# Patient Record
Sex: FEMALE | Race: WHITE | Employment: FULL TIME | URBAN - METROPOLITAN AREA
[De-identification: names, ages, dates, MRNs, and addresses within clinical notes are randomized per-mention and may not be internally consistent; named-entity substitution may affect disease eponyms.]

---

## 2017-04-27 LAB
CHLAMYDIA, EXTERNAL: NEGATIVE
HBSAG, EXTERNAL: NEGATIVE
N. GONORRHEA, EXTERNAL: NEGATIVE
RPR, EXTERNAL: NONREACTIVE
RUBELLA, EXTERNAL: NORMAL
TYPE, ABO & RH, EXTERNAL: NORMAL

## 2017-09-15 ENCOUNTER — HOSPITAL ENCOUNTER (OUTPATIENT)
Dept: INFUSION THERAPY | Age: 27
Discharge: HOME | End: 2017-09-15
Payer: COMMERCIAL

## 2017-09-15 VITALS
HEIGHT: 68 IN | WEIGHT: 208 LBS | HEART RATE: 81 BPM | TEMPERATURE: 98.5 F | BODY MASS INDEX: 31.52 KG/M2 | SYSTOLIC BLOOD PRESSURE: 109 MMHG | DIASTOLIC BLOOD PRESSURE: 70 MMHG | RESPIRATION RATE: 18 BRPM

## 2017-09-15 LAB
ABO + RH BLD: NORMAL
BLOOD GROUP ANTIBODIES SERPL: NORMAL
SPECIMEN EXP DATE BLD: NORMAL

## 2017-09-15 PROCEDURE — 96372 THER/PROPH/DIAG INJ SC/IM: CPT

## 2017-09-15 PROCEDURE — 36415 COLL VENOUS BLD VENIPUNCTURE: CPT | Performed by: OBSTETRICS & GYNECOLOGY

## 2017-09-15 PROCEDURE — 86900 BLOOD TYPING SEROLOGIC ABO: CPT | Performed by: OBSTETRICS & GYNECOLOGY

## 2017-09-15 PROCEDURE — 74011250636 HC RX REV CODE- 250/636: Performed by: OBSTETRICS & GYNECOLOGY

## 2017-09-15 RX ORDER — ACETAMINOPHEN 325 MG/1
650 TABLET ORAL
COMMUNITY

## 2017-09-15 RX ADMIN — HUMAN RHO(D) IMMUNE GLOBULIN 0.3 MG: 300 INJECTION, SOLUTION INTRAMUSCULAR at 14:37

## 2017-09-15 NOTE — DISCHARGE INSTRUCTIONS
Learning About Rh Immunoglobulin Shots  Introduction  An Rh immunoglobulin shot is given to pregnant women who have Rh-negative blood. You may have Rh-negative blood, and your baby may have Rh-positive blood. If the two types of blood mix, your body will make antibodies. This is called Rh sensitization. Most of the time, this is not a problem the first time you're pregnant. But it could cause problems in future pregnancies. This shot keeps your body from making the antibodies. You get the shot around 28 weeks of pregnancy. After the birth, your baby's blood is tested. If the blood is Rh positive, you will get another shot. You may also get the shot if you have vaginal bleeding while you are pregnant or if you have a miscarriage. These shots protect future pregnancies. Women with Rh negative blood will need this shot each time they get pregnant. Example  · Rh immunoglobulin (HypRho-D, MICRhoGAM, and RhoGAM)  Possible side effects  Rare side effects may include:  · Some mild pain where you got the shot. · A slight fever. · An allergic reaction. You may have other side effects not listed here. Check the information that comes with your medicine. What to know about taking this medicine  · You may need more than one shot. You may need the shot again:  ¨ After amniocentesis, fetal blood sampling, or chorionic villus sampling tests. ¨ If you have bleeding in your second or third trimester. ¨ After turning of a breech baby. ¨ After an injury to the belly while you are pregnant. ¨ After a miscarriage or an . ¨ Before or right after treatment for an ectopic or a partial molar pregnancy. · Tell your doctor if you have any allergies or have had a bad response to medicines in the past.  · If you get this shot within 3 months of getting a live-virus vaccine, the vaccine may not work. Your doctor will tell you if you need more vaccine.   · Check with your doctor or pharmacist before you use any other medicines. This includes over-the-counter medicines. Make sure your doctor knows all of the medicines, vitamins, herbs, and supplements you take. Taking some medicines at the same time can cause problems. Where can you learn more? Go to http://snehal-rosa isela.info/. Enter R266 in the search box to learn more about \"Learning About Rh Immunoglobulin Shots. \"  Current as of: March 16, 2017  Content Version: 11.3  © 5639-1403 [employer]. Care instructions adapted under license by [employer] (which disclaims liability or warranty for this information). If you have questions about a medical condition or this instruction, always ask your healthcare professional. [address] any warranty or liability for your use of this information.

## 2017-09-15 NOTE — PROGRESS NOTES
[facility] Progress Note    Date: September 15, 2017    Name: Maikol Milligan    MRN: [id number]         : 1990      Ms. Jefferson Vargas was assessed and education was provided. Consent witnessed for blood product administration. Provided with written information on Rhogam.    Ms. Konrad Brooks vitals were reviewed and patient was observed for 5 minutes prior to treatment. Visit Vitals    /70 (BP 1 Location: Left arm, BP Patient Position: Sitting)    Pulse 81    Temp 98.5 °F (36.9 °C)    Resp 18    Ht 5' 8\" (1.727 m)    Wt 94.3 kg (208 lb)    Breastfeeding No    BMI 31.63 kg/m2       Lab results were obtained and reviewed. Recent Results (from the past 12 hour(s))   TYPE & SCREEN    Collection Time: 09/15/17  1:04 PM   Result Value Ref Range    Crossmatch Expiration 2017     ABO/Rh(D) A NEGATIVE     Antibody screen NEG    RH IMMUNE GLOBULIN PROPHYLACTIC    Collection Time: 09/15/17  1:04 PM   Result Value Ref Range    No. of weeks gestation 28      Unit number [id number]     Blood component type RH IMMUNE GLOBULIN     Unit division 00     Status of unit ISSUED        Rhogam 300 mcg was administered IM in left deltoid. No irritation noted at site, band aid applied. Patient observed for 30 minutes, no s/s reaction noted. .       Ms. Jefferson Vargas tolerated well, and had no complaints. Patient armband removed and shredded. Ms. Jefferson Vargas was discharged from [place] [address] in stable condition at 1510. She has no appointment to return to [facility] but will follow up with Dr. Alvin Candelario as scheduled for her next appointment.     Aster Veras RN  September 15, 2017  3:11 PM

## 2017-09-16 LAB
BLD PROD TYP BPU: NORMAL
BPU ID: NORMAL
GA (WEEKS): 28 WK
STATUS OF UNIT,%ST: NORMAL
UNIT DIVISION, %UDIV: 0

## 2017-11-05 LAB — GRBS, EXTERNAL: NEGATIVE

## 2017-12-14 ENCOUNTER — HOSPITAL ENCOUNTER (INPATIENT)
Age: 27
LOS: 4 days | Discharge: HOME | End: 2017-12-18
Attending: OBSTETRICS & GYNECOLOGY | Admitting: OBSTETRICS & GYNECOLOGY
Payer: COMMERCIAL

## 2017-12-14 PROBLEM — O41.00X0 OLIGOHYDRAMNIOS: Status: ACTIVE | Noted: 2017-12-14

## 2017-12-14 LAB
ABO + RH BLD: NORMAL
BASOPHILS # BLD: 0 K/UL (ref 0–0.06)
BASOPHILS NFR BLD: 0 % (ref 0–2)
BLOOD BANK CMNT PATIENT-IMP: NORMAL
BLOOD GROUP ANTIBODIES SERPL: NORMAL
BLOOD GROUP ANTIBODIES SERPL: NORMAL
DIFFERENTIAL METHOD BLD: ABNORMAL
EOSINOPHIL # BLD: 0.1 K/UL (ref 0–0.4)
EOSINOPHIL NFR BLD: 1 % (ref 0–5)
ERYTHROCYTE [DISTWIDTH] IN BLOOD BY AUTOMATED COUNT: 13.7 % (ref 11.6–14.5)
HCT VFR BLD AUTO: 37.3 % (ref 35–45)
HGB BLD-MCNC: 12.5 G/DL (ref 12–16)
LYMPHOCYTES # BLD: 1.6 K/UL (ref 0.9–3.6)
LYMPHOCYTES NFR BLD: 20 % (ref 21–52)
MCH RBC QN AUTO: 28.3 PG (ref 24–34)
MCHC RBC AUTO-ENTMCNC: 33.5 G/DL (ref 31–37)
MCV RBC AUTO: 84.4 FL (ref 74–97)
MONOCYTES # BLD: 0.6 K/UL (ref 0.05–1.2)
MONOCYTES NFR BLD: 8 % (ref 3–10)
NEUTS SEG # BLD: 5.6 K/UL (ref 1.8–8)
NEUTS SEG NFR BLD: 71 % (ref 40–73)
PLATELET # BLD AUTO: 180 K/UL (ref 135–420)
PMV BLD AUTO: 9.9 FL (ref 9.2–11.8)
RBC # BLD AUTO: 4.42 M/UL (ref 4.2–5.3)
SPECIMEN EXP DATE BLD: NORMAL
WBC # BLD AUTO: 7.8 K/UL (ref 4.6–13.2)

## 2017-12-14 PROCEDURE — 86900 BLOOD TYPING SEROLOGIC ABO: CPT | Performed by: OBSTETRICS & GYNECOLOGY

## 2017-12-14 PROCEDURE — 36415 COLL VENOUS BLD VENIPUNCTURE: CPT | Performed by: OBSTETRICS & GYNECOLOGY

## 2017-12-14 PROCEDURE — 74011250637 HC RX REV CODE- 250/637: Performed by: ADVANCED PRACTICE MIDWIFE

## 2017-12-14 PROCEDURE — 75410000002 HC LABOR FEE PER 1 HR

## 2017-12-14 PROCEDURE — 85025 COMPLETE CBC W/AUTO DIFF WBC: CPT | Performed by: OBSTETRICS & GYNECOLOGY

## 2017-12-14 PROCEDURE — 74011250636 HC RX REV CODE- 250/636: Performed by: OBSTETRICS & GYNECOLOGY

## 2017-12-14 PROCEDURE — 86870 RBC ANTIBODY IDENTIFICATION: CPT | Performed by: OBSTETRICS & GYNECOLOGY

## 2017-12-14 PROCEDURE — 74011250637 HC RX REV CODE- 250/637: Performed by: OBSTETRICS & GYNECOLOGY

## 2017-12-14 PROCEDURE — 65270000029 HC RM PRIVATE

## 2017-12-14 RX ORDER — OXYTOCIN/RINGER'S LACTATE 20/1000 ML
125 PLASTIC BAG, INJECTION (ML) INTRAVENOUS CONTINUOUS
Status: DISCONTINUED | OUTPATIENT
Start: 2017-12-14 | End: 2017-12-16 | Stop reason: HOSPADM

## 2017-12-14 RX ORDER — OXYTOCIN IN 5 % DEXTROSE 30/500 ML
.5-2 PLASTIC BAG, INJECTION (ML) INTRAVENOUS
Status: CANCELLED | OUTPATIENT
Start: 2017-12-15

## 2017-12-14 RX ORDER — OXYTOCIN/RINGER'S LACTATE 20/1000 ML
500 PLASTIC BAG, INJECTION (ML) INTRAVENOUS ONCE
Status: ACTIVE | OUTPATIENT
Start: 2017-12-14 | End: 2017-12-15

## 2017-12-14 RX ORDER — CALCIUM CARBONATE 200(500)MG
200 TABLET,CHEWABLE ORAL
Status: DISCONTINUED | OUTPATIENT
Start: 2017-12-14 | End: 2017-12-18 | Stop reason: HOSPADM

## 2017-12-14 RX ORDER — LIDOCAINE HYDROCHLORIDE 10 MG/ML
20 INJECTION, SOLUTION EPIDURAL; INFILTRATION; INTRACAUDAL; PERINEURAL AS NEEDED
Status: DISCONTINUED | OUTPATIENT
Start: 2017-12-14 | End: 2017-12-16 | Stop reason: HOSPADM

## 2017-12-14 RX ORDER — NALBUPHINE HYDROCHLORIDE 10 MG/ML
10 INJECTION, SOLUTION INTRAMUSCULAR; INTRAVENOUS; SUBCUTANEOUS
Status: DISCONTINUED | OUTPATIENT
Start: 2017-12-14 | End: 2017-12-16 | Stop reason: HOSPADM

## 2017-12-14 RX ORDER — ZOLPIDEM TARTRATE 5 MG/1
5 TABLET ORAL
Status: DISCONTINUED | OUTPATIENT
Start: 2017-12-14 | End: 2017-12-18 | Stop reason: HOSPADM

## 2017-12-14 RX ORDER — METHYLERGONOVINE MALEATE 0.2 MG/ML
0.2 INJECTION INTRAVENOUS AS NEEDED
Status: DISCONTINUED | OUTPATIENT
Start: 2017-12-14 | End: 2017-12-16 | Stop reason: HOSPADM

## 2017-12-14 RX ORDER — MINERAL OIL
30 OIL (ML) ORAL AS NEEDED
Status: DISCONTINUED | OUTPATIENT
Start: 2017-12-14 | End: 2017-12-16 | Stop reason: HOSPADM

## 2017-12-14 RX ORDER — ONDANSETRON 2 MG/ML
4 INJECTION INTRAMUSCULAR; INTRAVENOUS
Status: DISCONTINUED | OUTPATIENT
Start: 2017-12-14 | End: 2017-12-16 | Stop reason: HOSPADM

## 2017-12-14 RX ORDER — SODIUM CHLORIDE, SODIUM LACTATE, POTASSIUM CHLORIDE, CALCIUM CHLORIDE 600; 310; 30; 20 MG/100ML; MG/100ML; MG/100ML; MG/100ML
125 INJECTION, SOLUTION INTRAVENOUS CONTINUOUS
Status: DISCONTINUED | OUTPATIENT
Start: 2017-12-14 | End: 2017-12-16 | Stop reason: HOSPADM

## 2017-12-14 RX ORDER — CALCIUM CARBONATE 200(500)MG
1 TABLET,CHEWABLE ORAL DAILY
COMMUNITY

## 2017-12-14 RX ORDER — TERBUTALINE SULFATE 1 MG/ML
0.25 INJECTION SUBCUTANEOUS
Status: DISCONTINUED | OUTPATIENT
Start: 2017-12-14 | End: 2017-12-16 | Stop reason: HOSPADM

## 2017-12-14 RX ORDER — BUTORPHANOL TARTRATE 2 MG/ML
2 INJECTION INTRAMUSCULAR; INTRAVENOUS
Status: DISCONTINUED | OUTPATIENT
Start: 2017-12-14 | End: 2017-12-16 | Stop reason: HOSPADM

## 2017-12-14 RX ORDER — HYDROMORPHONE HYDROCHLORIDE 2 MG/ML
1 INJECTION, SOLUTION INTRAMUSCULAR; INTRAVENOUS; SUBCUTANEOUS
Status: DISCONTINUED | OUTPATIENT
Start: 2017-12-14 | End: 2017-12-16 | Stop reason: HOSPADM

## 2017-12-14 RX ORDER — MISOPROSTOL 100 UG/1
25 TABLET ORAL EVERY 4 HOURS
Status: COMPLETED | OUTPATIENT
Start: 2017-12-14 | End: 2017-12-15

## 2017-12-14 RX ADMIN — ZOLPIDEM TARTRATE 5 MG: 5 TABLET ORAL at 22:07

## 2017-12-14 RX ADMIN — MISOPROSTOL 25 MCG: 100 TABLET ORAL at 16:10

## 2017-12-14 RX ADMIN — SODIUM CHLORIDE, SODIUM LACTATE, POTASSIUM CHLORIDE, AND CALCIUM CHLORIDE 125 ML/HR: 600; 310; 30; 20 INJECTION, SOLUTION INTRAVENOUS at 21:58

## 2017-12-14 RX ADMIN — MISOPROSTOL 25 MCG: 100 TABLET ORAL at 20:14

## 2017-12-14 RX ADMIN — ANTACID TABLETS 200 MG: 500 TABLET, CHEWABLE ORAL at 20:57

## 2017-12-14 RX ADMIN — SODIUM CHLORIDE, SODIUM LACTATE, POTASSIUM CHLORIDE, AND CALCIUM CHLORIDE 125 ML/HR: 600; 310; 30; 20 INJECTION, SOLUTION INTRAVENOUS at 15:24

## 2017-12-14 NOTE — IP AVS SNAPSHOT
Summary of Care Report The Summary of Care report has been created to help improve care coordination. Users with access to [employer] or [address] (Web-based application) may access additional patient information including the Discharge Summary. If you are not currently a [address] user and need more information, please call the number listed below in the [place] [address] section and ask to be connected with Medical Records. Facility Information Name Address Phone [address] [facility] [address] [phone] Patient Information Patient Name Sex  Dusty Quintero ([id number]) Female 1990 Discharge Information Admitting Provider Service Area Unit Jimi Winter MD / [address] / [phone] Discharge Provider Discharge Date/Time Discharge Disposition Destination Fletcher Zamorano MD / [phone] 2017 (Pending) AHR (none) Patient Language Language ENGLISH [13] Hospital Problems as of 2017  Reviewed: 2017 12:08 AM by Mayra Morrison MD  
  
  
  
 Class Noted - Resolved Last Modified POA Active Problems Chorioamnionitis  12/15/2017 - Present 12/15/2017 by Mayra Morrison MD No  
  Entered by Mayra Morrison MD  
  S/P  section  2017 - Present 2017 by Mayra Morrison MD No  
  Entered by Mayra Morrison MD  
  Arrest of dilation, delivered, current hospitalization  2017 - Present 2017 by Mayra Morrison MD No  
  Entered by Mayra Morrison MD  
  
Non-Hospital Problems as of 2017  Reviewed: 2017 12:08 AM by Mayra Morrison MD  
 None You are allergic to the following Allergen Reactions Kiwi Swelling Current Discharge Medication List  
  
START taking these medications Dose & Instructions Dispensing Information Comments  
 ibuprofen 800 mg tablet Commonly known as:  MOTRIN Start taking on:  2017 Dose:  800 mg Take 1 Tab by mouth every eight (8) hours as needed. Quantity:  60 Tab Refills:  1  
   
 oxyCODONE-acetaminophen 5-325 mg per tablet Commonly known as:  PERCOCET Dose:  1-2 Tab Take 1-2 Tabs by mouth every four (4) hours as needed. Max Daily Amount: 12 Tabs. Quantity:  30 Tab Refills:  0 ASK your doctor about these medications Dose & Instructions Dispensing Information Comments  
 calcium carbonate 200 mg calcium (500 mg) Chew Commonly known as:  TUMS Dose:  1 Tab Take 1 Tab by mouth daily. Refills:  0 PRENATAL DHA+COMPLETE PRENATAL -300 mg-mcg-mg Cmpk Generic drug:  [id number] jeremiah-folic-dha Take  by mouth. Refills:  0  
   
 TYLENOL 325 mg tablet Generic drug:  acetaminophen Dose:  650 mg Take 650 mg by mouth every four (4) hours as needed for Pain. Refills:  0 Current Immunizations Name Date Rho(D) Immune Globulin - IM 2017, 9/15/2017 Surgery Information ID Date/Time Status Primary Surgeon All Procedures Location [id number] 12/15/2017 Ronny Phoenix [facility] - DO NOT SCHEDULE    
 [id number] 12/15/2017 Fritz Bynum MD  SECTION [facility] L&D OR Follow-up Information Follow up With Details Comments Contact Info None   None (395) Patient stated that they have no PCP Discharge Instructions None Chart Review Routing History No Routing History on File

## 2017-12-14 NOTE — PROGRESS NOTES
1515 Bedside and Verbal shift change report given to WILEY Dean RN (oncoming nurse) by Beth Bran RN (offgoing nurse). Report included the following information SBAR, Kardex, Intake/Output, MAR, Recent Results and Med Rec Status. 1545 Bedside and Verbal shift change report given to Basim Davis (oncoming nurse) by WILEY Dean RN (offgoing nurse). Report included the following information SBAR, Kardex, Procedure Summary, Intake/Output, MAR, Recent Results and Med Rec Status.

## 2017-12-14 NOTE — IP AVS SNAPSHOT
Merlin Laroche 
 
 
 [address] [facility] [address] 
[phone] Patient: Corky Frias MRN: [id number] WQW:[birth year] My Medications TAKE these medications as instructed Instructions Each Dose to Equal  
 Morning Noon Evening Bedtime  
 ibuprofen 800 mg tablet Commonly known as:  MOTRIN Start taking on:  12/19/2017 Your last dose was: Your next dose is: Take 1 Tab by mouth every eight (8) hours as needed. 800 mg  
    
   
   
   
  
 oxyCODONE-acetaminophen 5-325 mg per tablet Commonly known as:  PERCOCET Your last dose was: Your next dose is: Take 1-2 Tabs by mouth every four (4) hours as needed. Max Daily Amount: 12 Tabs. 1-2 Tab ASK your physician about these medications Instructions Each Dose to Equal  
 Morning Noon Evening Bedtime  
 calcium carbonate 200 mg calcium (500 mg) Chew Commonly known as:  TUMS Your last dose was: Your next dose is: Take 1 Tab by mouth daily. 1 Tab PRENATAL DHA+COMPLETE PRENATAL -300 mg-mcg-mg Cmpk Generic drug:  [id number] jeremiah-folic-dha Your last dose was: Your next dose is: Take  by mouth. TYLENOL 325 mg tablet Generic drug:  acetaminophen Your last dose was: Your next dose is: Take 650 mg by mouth every four (4) hours as needed for Pain. 650 mg Where to Get Your Medications Information on where to get these meds will be given to you by the nurse or doctor. ! Ask your nurse or doctor about these medications  
  ibuprofen 800 mg tablet  
 oxyCODONE-acetaminophen 5-325 mg per tablet

## 2017-12-14 NOTE — H&P
Ostetrical History and Physical    Subjective:     Date of Admission: 2017    Patient is a 28 y.o. RH neg,  @ 40.6 wk admitted for postdate induction. For Obstetric history, see prenantal.    For Review of Systems, see prenatal    Past Medical History:   Diagnosis Date    Asthma       Past Surgical History:   Procedure Laterality Date    HX HEENT        Prior to Admission medications    Medication Sig Start Date End Date Taking? Authorizing Provider   calcium carbonate (TUMS) 200 mg calcium (500 mg) chew Take 1 Tab by mouth daily. Yes Historical Provider   [id number] jeremiah-folic-dha (PRENATAL DHA+COMPLETE PRENATAL) [id number] mg-mcg-mg cmpk Take  by mouth. Historical Provider   acetaminophen (TYLENOL) 325 mg tablet Take 650 mg by mouth every four (4) hours as needed for Pain. Historical Provider     Allergies   Allergen Reactions    Kiwi Swelling      Social History   Substance Use Topics    Smoking status: Former Smoker    Smokeless tobacco: Former User    Alcohol use 1.2 oz/week     2 Glasses of wine per week      No family history on file. Objective:     Blood pressure 126/71, pulse 90, temperature 98.4 °F (36.9 °C), resp. rate 16, currently breastfeeding. Temp (24hrs), Av.2 °F (36.8 °C), Min:98 °F (36.7 °C), Max:98.4 °F (36.9 °C)            HEENT: No pallor, no jaundice, Thyroid and throat normal  RESPIRATORY: Clear to A & P  CVS: pulse reg, HS normal  ABDOMEN: Gravid. Vertex. EFW= 8.5 lb +-1lb. No abnormal tenderness.    Pelvic:per RN: Cervix 1,   Effaced: 10%  Station:  -3  Data Review:   Recent Results (from the past 24 hour(s))   CBC WITH AUTOMATED DIFF    Collection Time: 17  3:40 PM   Result Value Ref Range    WBC 7.8 4.6 - 13.2 K/uL    RBC 4.42 4.20 - 5.30 M/uL    HGB 12.5 12.0 - 16.0 g/dL    HCT 37.3 35.0 - 45.0 %    MCV 84.4 74.0 - 97.0 FL    MCH 28.3 24.0 - 34.0 PG    MCHC 33.5 31.0 - 37.0 g/dL    RDW 13.7 11.6 - 14.5 %    PLATELET 610 398 - 650 K/uL MPV 9.9 9.2 - 11.8 FL    NEUTROPHILS 71 40 - 73 %    LYMPHOCYTES 20 (L) 21 - 52 %    MONOCYTES 8 3 - 10 %    EOSINOPHILS 1 0 - 5 %    BASOPHILS 0 0 - 2 %    ABS. NEUTROPHILS 5.6 1.8 - 8.0 K/UL    ABS. LYMPHOCYTES 1.6 0.9 - 3.6 K/UL    ABS. MONOCYTES 0.6 0.05 - 1.2 K/UL    ABS. EOSINOPHILS 0.1 0.0 - 0.4 K/UL    ABS. BASOPHILS 0.0 0.0 - 0.06 K/UL    DF AUTOMATED     TYPE & SCREEN    Collection Time: 12/14/17  3:40 PM   Result Value Ref Range    Crossmatch Expiration 12/17/2017     ABO/Rh(D) A NEGATIVE     Antibody screen POS      Monitor:  Reactivity:present Variability:present Baseline:within normal limits    Assessment:       Post date pregnancy  Anemia  Reassuring fetal staus  Plan:     Plan induction- cytotec now and this evening, pitocin in am  Dr. Desiree Renteria aware. Pt and FOB in agreement w/ plan. Check labs:  GBS neg  Check  Prenatal:    Disposition:     Type of admit:In-Patient    I saw and examined patient.     Signed By: Nati Owens CNM                         December 14, 2017

## 2017-12-14 NOTE — IP AVS SNAPSHOT
[address] [address] 
[phone] Patient: Shay Mendieta MRN: [id number] MVO:0/15/0625 About your hospitalization You were admitted on:  2017 You last received care in the:  [address] [place] [address] You were discharged on:  2017 Why you were hospitalized Your primary diagnosis was:  Oligohydramnios Your diagnoses also included:  Chorioamnionitis, S/P  Section, Arrest Of Dilation, Delivered, Current Hospitalization Things You Need To Do (next 8 weeks) Follow up with None Where:  None (395) Patient stated that they have no PCP Discharge Orders None A check charity indicates which time of day the medication should be taken. My Medications TAKE these medications as instructed Instructions Each Dose to Equal  
 Morning Noon Evening Bedtime  
 ibuprofen 800 mg tablet Commonly known as:  MOTRIN Start taking on:  2017 Your last dose was: Your next dose is: Take 1 Tab by mouth every eight (8) hours as needed. 800 mg  
    
   
   
   
  
 oxyCODONE-acetaminophen 5-325 mg per tablet Commonly known as:  PERCOCET Your last dose was: Your next dose is: Take 1-2 Tabs by mouth every four (4) hours as needed. Max Daily Amount: 12 Tabs. 1-2 Tab ASK your physician about these medications Instructions Each Dose to Equal  
 Morning Noon Evening Bedtime  
 calcium carbonate 200 mg calcium (500 mg) Chew Commonly known as:  TUMS Your last dose was: Your next dose is: Take 1 Tab by mouth daily. 1 Tab PRENATAL DHA+COMPLETE PRENATAL 5-300 mg-mcg-mg Cmpk Generic drug:  [id number] jeremiah-folic-dha Your last dose was: Your next dose is: Take  by mouth. TYLENOL 325 mg tablet Generic drug:  acetaminophen Your last dose was: Your next dose is: Take 650 mg by mouth every four (4) hours as needed for Pain. 650 mg Where to Get Your Medications Information on where to get these meds will be given to you by the nurse or doctor. ! Ask your nurse or doctor about these medications  
  ibuprofen 800 mg tablet  
 oxyCODONE-acetaminophen 5-325 mg per tablet Discharge Instructions None [employer] Announcement We are excited to announce that we are making your provider's discharge notes available to you in [employer]. You will see these notes when they are completed and signed by the physician that discharged you from your recent hospital stay. If you have any questions or concerns about any information you see in [employer], please call the Health Information Department where you were seen or reach out to your Primary Care Provider for more information about your plan of care. Introducing [facility]! [employer] introduces [employer] patient portal. Now you can access parts of your medical record, email your doctor's office, and request medication refills online. 1. In your internet browser, go to [email or web address] 2. Click on the First Time User? Click Here link in the Sign In box. You will see the New Member Sign Up page. 3. Enter your [employer] Access Code exactly as it appears below. You will not need to use this code after youve completed the sign-up process. If you do not sign up before the expiration date, you must request a new code. · [employer] Access Code: [email or web address] Expires: 3/18/2018 11:02 AM 
 
4. Enter the last four digits of your Social Security Number (xxxx) and Date of Birth (mm/dd/yyyy) as indicated and click Submit. You will be taken to the next sign-up page. 5. Create a [employer] ID. This will be your [employer] login ID and cannot be changed, so think of one that is secure and easy to remember. 6. Create a [employer] password. You can change your password at any time. 7. Enter your Password Reset Question and Answer. This can be used at a later time if you forget your password. 8. Enter your e-mail address. You will receive e-mail notification when new information is available in [address] 9. Click Sign Up. You can now view and download portions of your medical record. 10. Click the Download Summary menu link to download a portable copy of your medical information. If you have questions, please visit the Frequently Asked Questions section of the [employer] website. Remember, [employer] is NOT to be used for urgent needs. For medical emergencies, dial 911. Now available from your iPhone and Android! Providers Seen During Your Hospitalization Provider Specialty Primary office phone Ankita Schmid MD Obstetrics & Gynecology [phone] Rocío Matthew MD Obstetrics & Gynecology [phone] Immunizations Administered for This Admission Name Date Rho(D) Immune Globulin - IM 12/18/2017 Your Primary Care Physician (PCP) Primary Care Physician Office Phone Office Fax NONE ** None ** ** None ** You are allergic to the following Allergen Reactions Kiwi Swelling Recent Documentation Height Weight Breastfeeding? BMI OB Status Smoking Status 1.702 m 102.1 kg Yes 35.24 kg/m2 Recent pregnancy Former Smoker Emergency Contacts Name Discharge Info Relation Home Work Mobile Alton Tucker DISCHARGE CAREGIVER [3] Spouse [3]   [phone] Patient Belongings The following personal items are in your possession at time of discharge: 
  Dental Appliances: None  Visual Aid: None      Home Medications: None   Jewelry: Earrings, Ring  Clothing: At bedside    Other Valuables: None Please provide this summary of care documentation to your next provider. Signatures-by signing, you are acknowledging that this After Visit Summary has been reviewed with you and you have received a copy. Patient Signature:  ____________________________________________________________ Date:  ____________________________________________________________  
  
Edwar Ramona Provider Signature:  ____________________________________________________________ Date:  ____________________________________________________________

## 2017-12-15 ENCOUNTER — ANESTHESIA (OUTPATIENT)
Dept: LABOR AND DELIVERY | Age: 27
End: 2017-12-15
Payer: COMMERCIAL

## 2017-12-15 ENCOUNTER — ANESTHESIA EVENT (OUTPATIENT)
Dept: LABOR AND DELIVERY | Age: 27
End: 2017-12-15
Payer: COMMERCIAL

## 2017-12-15 PROBLEM — O41.1290 CHORIOAMNIONITIS: Status: ACTIVE | Noted: 2017-12-15

## 2017-12-15 PROCEDURE — 76060000032 HC ANESTHESIA 0.5 TO 1 HR: Performed by: OBSTETRICS & GYNECOLOGY

## 2017-12-15 PROCEDURE — 75410000003 HC RECOV DEL/VAG/CSECN EA 0.5 HR

## 2017-12-15 PROCEDURE — 74011250637 HC RX REV CODE- 250/637: Performed by: OBSTETRICS & GYNECOLOGY

## 2017-12-15 PROCEDURE — 00HU33Z INSERTION OF INFUSION DEVICE INTO SPINAL CANAL, PERCUTANEOUS APPROACH: ICD-10-PCS | Performed by: ANESTHESIOLOGY

## 2017-12-15 PROCEDURE — 74011250636 HC RX REV CODE- 250/636

## 2017-12-15 PROCEDURE — 77030018809 HC RETRCTR ALXSO DISP AMR -B: Performed by: OBSTETRICS & GYNECOLOGY

## 2017-12-15 PROCEDURE — 77030011640 HC PAD GRND REM COVD -A: Performed by: OBSTETRICS & GYNECOLOGY

## 2017-12-15 PROCEDURE — 77030034849

## 2017-12-15 PROCEDURE — 77030018836 HC SOL IRR NACL ICUM -A: Performed by: OBSTETRICS & GYNECOLOGY

## 2017-12-15 PROCEDURE — 3E0R3BZ INTRODUCTION OF ANESTHETIC AGENT INTO SPINAL CANAL, PERCUTANEOUS APPROACH: ICD-10-PCS | Performed by: ANESTHESIOLOGY

## 2017-12-15 PROCEDURE — 77030031139 HC SUT VCRL2 J&J -A: Performed by: OBSTETRICS & GYNECOLOGY

## 2017-12-15 PROCEDURE — 77030010507 HC ADH SKN DERMBND J&J -B: Performed by: OBSTETRICS & GYNECOLOGY

## 2017-12-15 PROCEDURE — 76060000078 HC EPIDURAL ANESTHESIA

## 2017-12-15 PROCEDURE — 74011250636 HC RX REV CODE- 250/636: Performed by: OBSTETRICS & GYNECOLOGY

## 2017-12-15 PROCEDURE — 77030011265 HC ELECTRD BLD HEX COVD -A: Performed by: OBSTETRICS & GYNECOLOGY

## 2017-12-15 PROCEDURE — 77030010848 HC CATH INTUTR PRSS KOLB -B

## 2017-12-15 PROCEDURE — 65270000029 HC RM PRIVATE

## 2017-12-15 PROCEDURE — 74011000250 HC RX REV CODE- 250

## 2017-12-15 PROCEDURE — 59200 INSERT CERVICAL DILATOR: CPT

## 2017-12-15 PROCEDURE — 75410000003 HC RECOV DEL/VAG/CSECN EA 0.5 HR: Performed by: OBSTETRICS & GYNECOLOGY

## 2017-12-15 PROCEDURE — 77030032490 HC SLV COMPR SCD KNE COVD -B: Performed by: OBSTETRICS & GYNECOLOGY

## 2017-12-15 PROCEDURE — 74011250636 HC RX REV CODE- 250/636: Performed by: ANESTHESIOLOGY

## 2017-12-15 PROCEDURE — 77030018749 HC HK AMNIO DISP DERY -A

## 2017-12-15 PROCEDURE — 10907ZC DRAINAGE OF AMNIOTIC FLUID, THERAPEUTIC FROM PRODUCTS OF CONCEPTION, VIA NATURAL OR ARTIFICIAL OPENING: ICD-10-PCS | Performed by: OBSTETRICS & GYNECOLOGY

## 2017-12-15 PROCEDURE — 77030008459 HC STPLR SKN COOP -B: Performed by: OBSTETRICS & GYNECOLOGY

## 2017-12-15 PROCEDURE — 75410000002 HC LABOR FEE PER 1 HR

## 2017-12-15 PROCEDURE — 77030007879 HC KT SPN EPDRL TELE -B: Performed by: ANESTHESIOLOGY

## 2017-12-15 PROCEDURE — 76010000391 HC C SECN FIRST 1 HR: Performed by: OBSTETRICS & GYNECOLOGY

## 2017-12-15 RX ORDER — LIDOCAINE HYDROCHLORIDE 10 MG/ML
INJECTION INFILTRATION; PERINEURAL AS NEEDED
Status: DISCONTINUED | OUTPATIENT
Start: 2017-12-15 | End: 2017-12-16 | Stop reason: HOSPADM

## 2017-12-15 RX ORDER — METHYLERGONOVINE MALEATE 0.2 MG/ML
INJECTION INTRAVENOUS AS NEEDED
Status: DISCONTINUED | OUTPATIENT
Start: 2017-12-15 | End: 2017-12-16 | Stop reason: HOSPADM

## 2017-12-15 RX ORDER — FENTANYL/ROPIVACAINE/NS/PF 2MCG/ML-.1
1-15 PLASTIC BAG, INJECTION (ML) EPIDURAL CONTINUOUS
Status: DISCONTINUED | OUTPATIENT
Start: 2017-12-15 | End: 2017-12-16 | Stop reason: HOSPADM

## 2017-12-15 RX ORDER — MISOPROSTOL 100 UG/1
25 TABLET ORAL ONCE
Status: COMPLETED | OUTPATIENT
Start: 2017-12-15 | End: 2017-12-15

## 2017-12-15 RX ORDER — SODIUM CHLORIDE 0.9 % (FLUSH) 0.9 %
5-10 SYRINGE (ML) INJECTION AS NEEDED
Status: DISCONTINUED | OUTPATIENT
Start: 2017-12-15 | End: 2017-12-18 | Stop reason: HOSPADM

## 2017-12-15 RX ORDER — LIDOCAINE HYDROCHLORIDE AND EPINEPHRINE 20; 5 MG/ML; UG/ML
INJECTION, SOLUTION EPIDURAL; INFILTRATION; INTRACAUDAL; PERINEURAL AS NEEDED
Status: DISCONTINUED | OUTPATIENT
Start: 2017-12-15 | End: 2017-12-16 | Stop reason: HOSPADM

## 2017-12-15 RX ORDER — LORATADINE 10 MG/1
10 TABLET ORAL DAILY PRN
Status: DISCONTINUED | OUTPATIENT
Start: 2017-12-15 | End: 2017-12-18 | Stop reason: HOSPADM

## 2017-12-15 RX ORDER — LIDOCAINE HYDROCHLORIDE AND EPINEPHRINE 15; 5 MG/ML; UG/ML
INJECTION, SOLUTION EPIDURAL AS NEEDED
Status: DISCONTINUED | OUTPATIENT
Start: 2017-12-15 | End: 2017-12-16 | Stop reason: HOSPADM

## 2017-12-15 RX ORDER — SODIUM CHLORIDE 0.9 % (FLUSH) 0.9 %
5-10 SYRINGE (ML) INJECTION EVERY 8 HOURS
Status: DISCONTINUED | OUTPATIENT
Start: 2017-12-15 | End: 2017-12-16 | Stop reason: HOSPADM

## 2017-12-15 RX ORDER — ACETAMINOPHEN 325 MG/1
650 TABLET ORAL
Status: DISCONTINUED | OUTPATIENT
Start: 2017-12-15 | End: 2017-12-18 | Stop reason: HOSPADM

## 2017-12-15 RX ORDER — OXYTOCIN IN 5 % DEXTROSE 30/500 ML
.5-2 PLASTIC BAG, INJECTION (ML) INTRAVENOUS
Status: DISCONTINUED | OUTPATIENT
Start: 2017-12-15 | End: 2017-12-18 | Stop reason: HOSPADM

## 2017-12-15 RX ORDER — SODIUM CHLORIDE, SODIUM LACTATE, POTASSIUM CHLORIDE, CALCIUM CHLORIDE 600; 310; 30; 20 MG/100ML; MG/100ML; MG/100ML; MG/100ML
125 INJECTION, SOLUTION INTRAVENOUS CONTINUOUS
Status: DISCONTINUED | OUTPATIENT
Start: 2017-12-15 | End: 2017-12-18 | Stop reason: HOSPADM

## 2017-12-15 RX ORDER — OXYTOCIN/RINGER'S LACTATE 20/1000 ML
PLASTIC BAG, INJECTION (ML) INTRAVENOUS
Status: DISCONTINUED | OUTPATIENT
Start: 2017-12-15 | End: 2017-12-16 | Stop reason: HOSPADM

## 2017-12-15 RX ORDER — NALBUPHINE HYDROCHLORIDE 10 MG/ML
5 INJECTION, SOLUTION INTRAMUSCULAR; INTRAVENOUS; SUBCUTANEOUS
Status: ACTIVE | OUTPATIENT
Start: 2017-12-15 | End: 2017-12-16

## 2017-12-15 RX ORDER — LIDOCAINE HYDROCHLORIDE 10 MG/ML
INJECTION INFILTRATION; PERINEURAL
Status: DISPENSED
Start: 2017-12-15 | End: 2017-12-16

## 2017-12-15 RX ORDER — FENTANYL CITRATE 50 UG/ML
100 INJECTION, SOLUTION INTRAMUSCULAR; INTRAVENOUS ONCE
Status: DISCONTINUED | OUTPATIENT
Start: 2017-12-15 | End: 2017-12-16 | Stop reason: HOSPADM

## 2017-12-15 RX ORDER — KETOROLAC TROMETHAMINE 30 MG/ML
30 INJECTION, SOLUTION INTRAMUSCULAR; INTRAVENOUS
Status: DISCONTINUED | OUTPATIENT
Start: 2017-12-15 | End: 2017-12-16

## 2017-12-15 RX ORDER — SODIUM CHLORIDE 0.9 % (FLUSH) 0.9 %
5-10 SYRINGE (ML) INJECTION EVERY 8 HOURS
Status: DISCONTINUED | OUTPATIENT
Start: 2017-12-16 | End: 2017-12-18 | Stop reason: HOSPADM

## 2017-12-15 RX ORDER — NALOXONE HYDROCHLORIDE 0.4 MG/ML
0.2 INJECTION, SOLUTION INTRAMUSCULAR; INTRAVENOUS; SUBCUTANEOUS
Status: DISCONTINUED | OUTPATIENT
Start: 2017-12-15 | End: 2017-12-18 | Stop reason: HOSPADM

## 2017-12-15 RX ORDER — ONDANSETRON 2 MG/ML
4 INJECTION INTRAMUSCULAR; INTRAVENOUS
Status: DISCONTINUED | OUTPATIENT
Start: 2017-12-15 | End: 2017-12-18 | Stop reason: HOSPADM

## 2017-12-15 RX ORDER — ACETAMINOPHEN 500 MG
1000 TABLET ORAL ONCE
Status: COMPLETED | OUTPATIENT
Start: 2017-12-15 | End: 2017-12-15

## 2017-12-15 RX ORDER — PHENYLEPHRINE HCL IN 0.9% NACL 1 MG/10 ML
80 SYRINGE (ML) INTRAVENOUS AS NEEDED
Status: DISCONTINUED | OUTPATIENT
Start: 2017-12-15 | End: 2017-12-16 | Stop reason: HOSPADM

## 2017-12-15 RX ORDER — SODIUM CHLORIDE 0.9 % (FLUSH) 0.9 %
5-10 SYRINGE (ML) INJECTION AS NEEDED
Status: DISCONTINUED | OUTPATIENT
Start: 2017-12-15 | End: 2017-12-16 | Stop reason: HOSPADM

## 2017-12-15 RX ORDER — CEFAZOLIN SODIUM 2 G/50ML
2 SOLUTION INTRAVENOUS ONCE
Status: COMPLETED | OUTPATIENT
Start: 2017-12-15 | End: 2017-12-15

## 2017-12-15 RX ORDER — FENTANYL CITRATE 50 UG/ML
INJECTION, SOLUTION INTRAMUSCULAR; INTRAVENOUS AS NEEDED
Status: DISCONTINUED | OUTPATIENT
Start: 2017-12-15 | End: 2017-12-16 | Stop reason: HOSPADM

## 2017-12-15 RX ORDER — NALOXONE HYDROCHLORIDE 0.4 MG/ML
0.2 INJECTION, SOLUTION INTRAMUSCULAR; INTRAVENOUS; SUBCUTANEOUS AS NEEDED
Status: DISCONTINUED | OUTPATIENT
Start: 2017-12-15 | End: 2017-12-16 | Stop reason: HOSPADM

## 2017-12-15 RX ORDER — MORPHINE SULFATE 1 MG/ML
INJECTION, SOLUTION EPIDURAL; INTRATHECAL; INTRAVENOUS AS NEEDED
Status: DISCONTINUED | OUTPATIENT
Start: 2017-12-15 | End: 2017-12-16 | Stop reason: HOSPADM

## 2017-12-15 RX ADMIN — FENTANYL CITRATE 100 MCG: 50 INJECTION, SOLUTION INTRAMUSCULAR; INTRAVENOUS at 14:00

## 2017-12-15 RX ADMIN — LIDOCAINE HYDROCHLORIDE AND EPINEPHRINE 3 ML: 15; 5 INJECTION, SOLUTION EPIDURAL at 13:59

## 2017-12-15 RX ADMIN — Medication: at 23:40

## 2017-12-15 RX ADMIN — LIDOCAINE HYDROCHLORIDE AND EPINEPHRINE 5 ML: 20; 5 INJECTION, SOLUTION EPIDURAL; INFILTRATION; INTRACAUDAL; PERINEURAL at 23:23

## 2017-12-15 RX ADMIN — SODIUM CHLORIDE, SODIUM LACTATE, POTASSIUM CHLORIDE, AND CALCIUM CHLORIDE: 600; 310; 30; 20 INJECTION, SOLUTION INTRAVENOUS at 23:18

## 2017-12-15 RX ADMIN — LIDOCAINE HYDROCHLORIDE AND EPINEPHRINE 5 ML: 20; 5 INJECTION, SOLUTION EPIDURAL; INFILTRATION; INTRACAUDAL; PERINEURAL at 23:21

## 2017-12-15 RX ADMIN — CEFAZOLIN SODIUM 2 G: 2 SOLUTION INTRAVENOUS at 23:26

## 2017-12-15 RX ADMIN — MORPHINE SULFATE 2 MG: 1 INJECTION, SOLUTION EPIDURAL; INTRATHECAL; INTRAVENOUS at 23:41

## 2017-12-15 RX ADMIN — BUTORPHANOL TARTRATE 2 MG: 2 INJECTION, SOLUTION INTRAMUSCULAR; INTRAVENOUS at 11:55

## 2017-12-15 RX ADMIN — ROPIVACAINE HYDROCHLORIDE 12 ML/HR: 10 INJECTION, SOLUTION EPIDURAL at 14:03

## 2017-12-15 RX ADMIN — ROPIVACAINE HYDROCHLORIDE 12 ML/HR: 10 INJECTION, SOLUTION EPIDURAL at 20:17

## 2017-12-15 RX ADMIN — LIDOCAINE HYDROCHLORIDE 2 ML: 10 INJECTION INFILTRATION; PERINEURAL at 13:56

## 2017-12-15 RX ADMIN — LIDOCAINE HYDROCHLORIDE AND EPINEPHRINE 5 ML: 20; 5 INJECTION, SOLUTION EPIDURAL; INFILTRATION; INTRACAUDAL; PERINEURAL at 23:25

## 2017-12-15 RX ADMIN — BUTORPHANOL TARTRATE 2 MG: 2 INJECTION, SOLUTION INTRAMUSCULAR; INTRAVENOUS at 09:35

## 2017-12-15 RX ADMIN — Medication 6 MILLI-UNITS/MIN: at 08:50

## 2017-12-15 RX ADMIN — SODIUM CHLORIDE, SODIUM LACTATE, POTASSIUM CHLORIDE, AND CALCIUM CHLORIDE 125 ML/HR: 600; 310; 30; 20 INJECTION, SOLUTION INTRAVENOUS at 05:45

## 2017-12-15 RX ADMIN — SODIUM CHLORIDE, SODIUM LACTATE, POTASSIUM CHLORIDE, AND CALCIUM CHLORIDE 125 ML/HR: 600; 310; 30; 20 INJECTION, SOLUTION INTRAVENOUS at 22:33

## 2017-12-15 RX ADMIN — MISOPROSTOL 25 MCG: 100 TABLET ORAL at 00:19

## 2017-12-15 RX ADMIN — LIDOCAINE HYDROCHLORIDE 3 ML: 10 INJECTION INFILTRATION; PERINEURAL at 13:55

## 2017-12-15 RX ADMIN — ACETAMINOPHEN 1000 MG: 500 TABLET ORAL at 22:24

## 2017-12-15 RX ADMIN — SODIUM CHLORIDE, SODIUM LACTATE, POTASSIUM CHLORIDE, AND CALCIUM CHLORIDE 125 ML/HR: 600; 310; 30; 20 INJECTION, SOLUTION INTRAVENOUS at 14:00

## 2017-12-15 RX ADMIN — SODIUM CHLORIDE, SODIUM LACTATE, POTASSIUM CHLORIDE, AND CALCIUM CHLORIDE 1000 ML: 600; 310; 30; 20 INJECTION, SOLUTION INTRAVENOUS at 13:05

## 2017-12-15 RX ADMIN — MISOPROSTOL 25 MCG: 100 TABLET ORAL at 04:14

## 2017-12-15 RX ADMIN — METHYLERGONOVINE MALEATE 0.2 MG: 0.2 INJECTION INTRAVENOUS at 23:47

## 2017-12-15 RX ADMIN — LIDOCAINE HYDROCHLORIDE AND EPINEPHRINE 5 ML: 20; 5 INJECTION, SOLUTION EPIDURAL; INFILTRATION; INTRACAUDAL; PERINEURAL at 23:18

## 2017-12-15 RX ADMIN — LIDOCAINE HYDROCHLORIDE AND EPINEPHRINE 2 ML: 15; 5 INJECTION, SOLUTION EPIDURAL at 13:57

## 2017-12-15 RX ADMIN — SODIUM CHLORIDE, SODIUM LACTATE, POTASSIUM CHLORIDE, AND CALCIUM CHLORIDE 500 ML: 600; 310; 30; 20 INJECTION, SOLUTION INTRAVENOUS at 22:08

## 2017-12-15 NOTE — ROUTINE PROCESS
1630:  at bedside assessing patient. AROM moderate amount of clear fluid. SVE 4/90/0. Lashanda care, bed pad changed. Patient repositioned left lateral with peanut ball. Patient reports no pain or needs at this time.

## 2017-12-15 NOTE — ROUTINE PROCESS
1234:  Patient resting with eyes closed. Spa team present providing massage. 1258:  Dr. Holly Koch at bedside assessing patient and discussing plan of care. SVE by  3/80/+1.   Patient request an epidural. Fluid bolus started for epidural.    1303:  Patient off monitor to void urine per request.

## 2017-12-15 NOTE — PROGRESS NOTES
OB Progress Note    S: Pt reports pain well controlled with epidural    O: Patient Vitals for the past 4 hrs:   Temp Pulse Resp BP SpO2   12/15/17 1509 - - - - 100 %   12/15/17 1504 - - - - 100 %   12/15/17 1500 - 67 - 115/68 -   12/15/17 1459 - - - - 100 %   12/15/17 1454 - - - - 100 %   12/15/17 1450 97.9 °F (36.6 °C) - 18 - -   12/15/17 1449 - - - - 100 %   12/15/17 1445 - 81 - 125/77 -   12/15/17 1444 - - - - 100 %   12/15/17 1439 - - - - 100 %   12/15/17 1434 - - - - 100 %   12/15/17 1430 - 75 - 126/78 -   12/15/17 1429 - - - - 99 %   12/15/17 1424 - - - - 99 %   12/15/17 1423 - - - - 100 %   12/15/17 1415 - 92 - 133/75 -   12/15/17 1408 - 78 - 132/68 100 %   12/15/17 1406 - 80 - 134/78 -   12/15/17 1404 - 88 - 122/71 -   12/15/17 1403 - - - - 100 %   12/15/17 1402 - 85 - 132/72 -   12/15/17 1400 - 77 - 130/75 -   12/15/17 1358 - 84 - 135/79 100 %   12/15/17 1353 - - - - 100 %   12/15/17 1352 - 95 - 149/88 -   12/15/17 1348 - - - - 100 %   12/15/17 1316 - 71 - 136/80 -            VE: AROM with no fluid; 4-5/80/-1       TOCO: IUPC placed       FHT: category 1        Presentation:  A/P: IUP at  [id number]          GBS- anticipate     Lb Castillo MD  December 15, 2017

## 2017-12-15 NOTE — PROGRESS NOTES
0703 verbal bedside shift report received from Elizabeth Bueno, RN.       3324 POC discussed, patient denies further questions or needs at this time. 8400 Patient requesting to take shower, will discuss with MD.     Peng Camara paged. 5069 Dr. Dean Camara informed patient wishing to shower, last cytotec was placed 3.5 hours ago, FHT reactive, occ mild ctx. Orders received for regular diet for breakfast, patient may shower as well, pitocin to be started after patient is done eating. 2492 patient given menu, instructed how to order, IV fluid paused, IV site covered. Patient denies needs. 8663 breakfast delivered, patient instructed to press call ball when she is done eating so pitocin may be started. Pt denies questions or needs. 7661 SVE-difficult exam, cervix very posterior +2 station, difficulty tolerating exam. Georg Buerger Dr. Orren Evans at bedside. MD informed unable to assess dilation due to low station of baby +2 MD will return around noon to check cervix, patient may get epidural when needed. [phone] patient assisted to restroom. [phone] Dr. Madeline Hernandez at bedside explaining epidural procedure, side effects, risks, benefits, and positioning. Patient verbalizes understanding. Time-out: 1349  Procedure start: 1355  Test dose: 9055  Catheter in, needle out: 1358  Test dose 2: 1359  Fentanyl ampule handed to MD at bedside. 1400  Loading dose:   1401  Patient connected to pump: 1403      0205 EFM adjusted. 1922 Bedside and Verbal shift change report given to PEDRO Parrish RN (oncoming nurse) by Ashlee Mathews RN (offgoing nurse). Report included the following information SBAR, Kardex, Intake/Output and MAR.

## 2017-12-15 NOTE — PROGRESS NOTES
Bedside and Verbal shift change report given to Bryan Mejia RN (oncoming nurse) by Jennifer Presley RN (offgoing nurse). Report included the following information SBAR, Kardex and Recent Results.

## 2017-12-15 NOTE — ANESTHESIA PROCEDURE NOTES
Epidural Block    Start time: 12/15/2017 1:49 PM  End time: 12/15/2017 2:03 PM  Performed by: Walter Landau by: Christopher Bhat     Pre-Procedure  Indication: at surgeon's request and labor epidural    Preanesthetic Checklist: patient identified, risks and benefits discussed, anesthesia consent, site marked, patient being monitored, timeout performed and anesthesia consent    Timeout Time: 13:49        Epidural:   Patient position:  Seated  Prep region:  Lumbar  Prep: Chlorhexidine    Location:  L3-4    Needle and Epidural Catheter:   Needle Type:  Tuohy  Needle Gauge:  17 G  Injection Technique:  Loss of resistance using air and loss of resistance using saline  Attempts:  1  Catheter Size:  20 G  Catheter at Skin Depth (cm):  10  Depth in Epidural Space (cm):  5  Events: no blood with aspiration, no cerebrospinal fluid with aspiration, no paresthesia and negative aspiration test    Test Dose:  Lidocaine 1.5% w/ epi and negative    Assessment:   Catheter Secured:  Tegaderm and tape  Insertion:  Uncomplicated  Patient tolerance:  Patient tolerated the procedure well with no immediate complications  Risks of bleeding, infection, nerve injury, failed block, spinal tap causing headache and requiring epidural blood patch, back pain, and failed block discussed. Procedure described as elective. Pt understands and desires to proceed. After epidural placement, patient without sensory or motor block. Patient denies headache or backache.

## 2017-12-15 NOTE — PROGRESS NOTES
S: Pt. Doing well.  No complaints  O: FHTs: category 1  [place]: irritable  VE: 1/60/+1  A/P: 40.6 WGA; GBS-  1) Continue Pitocin, will reassess cervix at lunch

## 2017-12-15 NOTE — ANESTHESIA PREPROCEDURE EVALUATION
Anesthetic History   No history of anesthetic complications            Review of Systems / Medical History  Patient summary reviewed, nursing notes reviewed and pertinent labs reviewed    Pulmonary            Asthma        Neuro/Psych   Within defined limits           Cardiovascular  Within defined limits                Exercise tolerance: >4 METS     GI/Hepatic/Renal  Within defined limits              Endo/Other  Within defined limits           Other Findings              Physical Exam    Airway  Mallampati: III  TM Distance: 4 - 6 cm  Neck ROM: normal range of motion   Mouth opening: Normal     Cardiovascular  Regular rate and rhythm,  S1 and S2 normal,  no murmur, click, rub, or gallop  Rhythm: regular  Rate: normal         Dental  No notable dental hx       Pulmonary  Breath sounds clear to auscultation               Abdominal  GI exam deferred       Other Findings            Anesthetic Plan    ASA: 2  Anesthesia type: epidural            Anesthetic plan and risks discussed with: Patient and Spouse

## 2017-12-15 NOTE — PROGRESS NOTES
OB Progress Note    S: Pt reports pain has increased notably    O: Patient Vitals for the past 4 hrs:   Temp Pulse Resp BP SpO2   12/15/17 1509 - - - - 100 %   12/15/17 1504 - - - - 100 %   12/15/17 1500 - 67 - 115/68 -   12/15/17 1459 - - - - 100 %   12/15/17 1454 - - - - 100 %   12/15/17 1450 97.9 °F (36.6 °C) - 18 - -   12/15/17 1449 - - - - 100 %   12/15/17 1445 - 81 - 125/77 -   12/15/17 1444 - - - - 100 %   12/15/17 1439 - - - - 100 %   12/15/17 1434 - - - - 100 %   12/15/17 1430 - 75 - 126/78 -   12/15/17 1429 - - - - 99 %   12/15/17 1424 - - - - 99 %   12/15/17 1423 - - - - 100 %   12/15/17 1415 - 92 - 133/75 -   12/15/17 1408 - 78 - 132/68 100 %   12/15/17 1406 - 80 - 134/78 -   12/15/17 1404 - 88 - 122/71 -   12/15/17 1403 - - - - 100 %   12/15/17 1402 - 85 - 132/72 -   12/15/17 1400 - 77 - 130/75 -   12/15/17 1358 - 84 - 135/79 100 %   12/15/17 1353 - - - - 100 %   12/15/17 1352 - 95 - 149/88 -   12/15/17 1348 - - - - 100 %   12/15/17 1316 - 71 - 136/80 -            VE: 3/100/0- Does not want AROM until after epidural        TOCO: irritable       FHT: category 1        Presentation:  A/P: IUP at  [id number]          GBS -  Will AROM after epidural    Gina Ward MD  December 15, 2017

## 2017-12-15 NOTE — PROGRESS NOTES
1910 - Bedside report received from Adama Sánchez. 1913 - Assessment complete. POC discussed, questions and concerns addressed. 0330 - Dr. Alvaro Juarez called unit, update on patient given. Orders received for 4th dose of Cytotec and start Pitocin at 0700.  3495 - SVE 1/50/-3  8376 - Dr. Jenn Marks called unit. POC discussed and report given on SVE. No further orders at this time. 3266 - Bedside and Verbal shift change report given to Frances Briceño RN (oncoming nurse) by Sunday Valentine RN (offgoing nurse). Report included the following information SBAR, Kardex, Intake/Output, MAR and Recent Results.

## 2017-12-16 PROBLEM — Z98.891 S/P CESAREAN SECTION: Status: ACTIVE | Noted: 2017-12-16

## 2017-12-16 PROBLEM — O41.00X0 OLIGOHYDRAMNIOS: Status: RESOLVED | Noted: 2017-12-14 | Resolved: 2017-12-16

## 2017-12-16 LAB
BASOPHILS # BLD: 0 K/UL (ref 0–0.06)
BASOPHILS NFR BLD: 0 % (ref 0–2)
DIFFERENTIAL METHOD BLD: ABNORMAL
EOSINOPHIL # BLD: 0.1 K/UL (ref 0–0.4)
EOSINOPHIL NFR BLD: 0 % (ref 0–5)
ERYTHROCYTE [DISTWIDTH] IN BLOOD BY AUTOMATED COUNT: 13.9 % (ref 11.6–14.5)
HCT VFR BLD AUTO: 30.7 % (ref 35–45)
HGB BLD-MCNC: 10.4 G/DL (ref 12–16)
LYMPHOCYTES # BLD: 2 K/UL (ref 0.9–3.6)
LYMPHOCYTES NFR BLD: 14 % (ref 21–52)
MCH RBC QN AUTO: 28.8 PG (ref 24–34)
MCHC RBC AUTO-ENTMCNC: 33.9 G/DL (ref 31–37)
MCV RBC AUTO: 85 FL (ref 74–97)
MONOCYTES # BLD: 1.3 K/UL (ref 0.05–1.2)
MONOCYTES NFR BLD: 9 % (ref 3–10)
NEUTS SEG # BLD: 11.1 K/UL (ref 1.8–8)
NEUTS SEG NFR BLD: 77 % (ref 40–73)
PLATELET # BLD AUTO: 138 K/UL (ref 135–420)
PMV BLD AUTO: 9.8 FL (ref 9.2–11.8)
RBC # BLD AUTO: 3.61 M/UL (ref 4.2–5.3)
WBC # BLD AUTO: 14.4 K/UL (ref 4.6–13.2)

## 2017-12-16 PROCEDURE — 74011250636 HC RX REV CODE- 250/636: Performed by: OBSTETRICS & GYNECOLOGY

## 2017-12-16 PROCEDURE — 85025 COMPLETE CBC W/AUTO DIFF WBC: CPT | Performed by: OBSTETRICS & GYNECOLOGY

## 2017-12-16 PROCEDURE — 77030027138 HC INCENT SPIROMETER -A

## 2017-12-16 PROCEDURE — 65270000029 HC RM PRIVATE

## 2017-12-16 PROCEDURE — 74011250637 HC RX REV CODE- 250/637: Performed by: OBSTETRICS & GYNECOLOGY

## 2017-12-16 PROCEDURE — 74011250637 HC RX REV CODE- 250/637: Performed by: ADVANCED PRACTICE MIDWIFE

## 2017-12-16 PROCEDURE — 36415 COLL VENOUS BLD VENIPUNCTURE: CPT | Performed by: OBSTETRICS & GYNECOLOGY

## 2017-12-16 PROCEDURE — 88307 TISSUE EXAM BY PATHOLOGIST: CPT | Performed by: OBSTETRICS & GYNECOLOGY

## 2017-12-16 RX ORDER — ACETAMINOPHEN 325 MG/1
650 TABLET ORAL
Status: DISCONTINUED | OUTPATIENT
Start: 2017-12-16 | End: 2017-12-18 | Stop reason: HOSPADM

## 2017-12-16 RX ORDER — SODIUM CHLORIDE 0.9 % (FLUSH) 0.9 %
5-10 SYRINGE (ML) INJECTION AS NEEDED
Status: DISCONTINUED | OUTPATIENT
Start: 2017-12-16 | End: 2017-12-18 | Stop reason: HOSPADM

## 2017-12-16 RX ORDER — OXYCODONE AND ACETAMINOPHEN 5; 325 MG/1; MG/1
1-2 TABLET ORAL
Status: DISCONTINUED | OUTPATIENT
Start: 2017-12-16 | End: 2017-12-18 | Stop reason: HOSPADM

## 2017-12-16 RX ORDER — ZOLPIDEM TARTRATE 5 MG/1
5 TABLET ORAL
Status: DISCONTINUED | OUTPATIENT
Start: 2017-12-16 | End: 2017-12-18 | Stop reason: HOSPADM

## 2017-12-16 RX ORDER — SODIUM CHLORIDE 0.9 % (FLUSH) 0.9 %
5-10 SYRINGE (ML) INJECTION EVERY 8 HOURS
Status: DISCONTINUED | OUTPATIENT
Start: 2017-12-16 | End: 2017-12-18 | Stop reason: HOSPADM

## 2017-12-16 RX ORDER — KETOROLAC TROMETHAMINE 30 MG/ML
30 INJECTION, SOLUTION INTRAMUSCULAR; INTRAVENOUS EVERY 6 HOURS
Status: DISPENSED | OUTPATIENT
Start: 2017-12-16 | End: 2017-12-17

## 2017-12-16 RX ORDER — SODIUM CHLORIDE, SODIUM LACTATE, POTASSIUM CHLORIDE, CALCIUM CHLORIDE 600; 310; 30; 20 MG/100ML; MG/100ML; MG/100ML; MG/100ML
125 INJECTION, SOLUTION INTRAVENOUS CONTINUOUS
Status: DISCONTINUED | OUTPATIENT
Start: 2017-12-16 | End: 2017-12-18 | Stop reason: HOSPADM

## 2017-12-16 RX ORDER — PROMETHAZINE HYDROCHLORIDE 25 MG/ML
25 INJECTION, SOLUTION INTRAMUSCULAR; INTRAVENOUS
Status: DISCONTINUED | OUTPATIENT
Start: 2017-12-16 | End: 2017-12-18 | Stop reason: HOSPADM

## 2017-12-16 RX ORDER — SIMETHICONE 80 MG
80 TABLET,CHEWABLE ORAL
Status: DISCONTINUED | OUTPATIENT
Start: 2017-12-16 | End: 2017-12-18 | Stop reason: HOSPADM

## 2017-12-16 RX ORDER — FACIAL-BODY WIPES
10 EACH TOPICAL
Status: DISCONTINUED | OUTPATIENT
Start: 2017-12-16 | End: 2017-12-18 | Stop reason: HOSPADM

## 2017-12-16 RX ORDER — DOCUSATE SODIUM 100 MG/1
100 CAPSULE, LIQUID FILLED ORAL 2 TIMES DAILY
Status: DISCONTINUED | OUTPATIENT
Start: 2017-12-16 | End: 2017-12-18 | Stop reason: HOSPADM

## 2017-12-16 RX ORDER — IBUPROFEN 400 MG/1
800 TABLET ORAL
Status: DISCONTINUED | OUTPATIENT
Start: 2017-12-19 | End: 2017-12-18 | Stop reason: SDUPTHER

## 2017-12-16 RX ORDER — OXYTOCIN/RINGER'S LACTATE 20/1000 ML
125 PLASTIC BAG, INJECTION (ML) INTRAVENOUS CONTINUOUS
Status: DISCONTINUED | OUTPATIENT
Start: 2017-12-16 | End: 2017-12-18 | Stop reason: HOSPADM

## 2017-12-16 RX ORDER — SODIUM CHLORIDE, SODIUM LACTATE, POTASSIUM CHLORIDE, CALCIUM CHLORIDE 600; 310; 30; 20 MG/100ML; MG/100ML; MG/100ML; MG/100ML
125 INJECTION, SOLUTION INTRAVENOUS CONTINUOUS
Status: DISPENSED | OUTPATIENT
Start: 2017-12-16 | End: 2017-12-17

## 2017-12-16 RX ADMIN — DOCUSATE SODIUM 100 MG: 100 CAPSULE, LIQUID FILLED ORAL at 22:12

## 2017-12-16 RX ADMIN — ACETAMINOPHEN 650 MG: 325 TABLET ORAL at 04:41

## 2017-12-16 RX ADMIN — SODIUM CHLORIDE, SODIUM LACTATE, POTASSIUM CHLORIDE, AND CALCIUM CHLORIDE 125 ML/HR: 600; 310; 30; 20 INJECTION, SOLUTION INTRAVENOUS at 01:20

## 2017-12-16 RX ADMIN — OXYCODONE HYDROCHLORIDE AND ACETAMINOPHEN 2 TABLET: 5; 325 TABLET ORAL at 20:09

## 2017-12-16 RX ADMIN — ANTACID TABLETS 200 MG: 500 TABLET, CHEWABLE ORAL at 07:30

## 2017-12-16 RX ADMIN — KETOROLAC TROMETHAMINE 30 MG: 30 INJECTION, SOLUTION INTRAMUSCULAR at 16:32

## 2017-12-16 RX ADMIN — Medication 125 ML/HR: at 02:42

## 2017-12-16 RX ADMIN — KETOROLAC TROMETHAMINE 30 MG: 30 INJECTION, SOLUTION INTRAMUSCULAR at 01:26

## 2017-12-16 RX ADMIN — ANTACID TABLETS 200 MG: 500 TABLET, CHEWABLE ORAL at 17:26

## 2017-12-16 RX ADMIN — OXYCODONE HYDROCHLORIDE AND ACETAMINOPHEN 1 TABLET: 5; 325 TABLET ORAL at 10:35

## 2017-12-16 RX ADMIN — KETOROLAC TROMETHAMINE 30 MG: 30 INJECTION, SOLUTION INTRAMUSCULAR at 22:14

## 2017-12-16 RX ADMIN — KETOROLAC TROMETHAMINE 30 MG: 30 INJECTION, SOLUTION INTRAMUSCULAR at 09:56

## 2017-12-16 RX ADMIN — OXYCODONE HYDROCHLORIDE AND ACETAMINOPHEN 0.5 TABLET: 5; 325 TABLET ORAL at 07:37

## 2017-12-16 NOTE — LACTATION NOTE
This note was copied from a baby's chart. Gave latch assist to help with short nipples as mom states nipple shield has been causing too much pain to use. Nipples are both bruised and blistered. Breastfeeding basics and log sheet discussed.

## 2017-12-16 NOTE — PROGRESS NOTES
0710 Rec'd bedside/verbal report from [facility]. Pt resting quietly in bed with baby on chest.   0730 Pt requested half a tablet of percocet for pain 4/10. Menu given to patient. 1000 ambulated in room with nurse, tolerated well. 1010 verbal/bedside report given to Cecilia Brady RN.

## 2017-12-16 NOTE — ANESTHESIA POSTPROCEDURE EVALUATION
Post-Anesthesia Evaluation and Assessment    Cardiovascular Function/Vital Signs  Visit Vitals    /66    Pulse 96    Temp 37.7 °C (99.9 °F)    Resp 16    Ht 5' 7\" (1.702 m)    Wt 102.1 kg (225 lb)    SpO2 99%    Breastfeeding Yes    BMI 35.24 kg/m2       Patient is status post Procedure(s):   SECTION. Nausea/Vomiting: Controlled. Postoperative hydration reviewed and adequate. Pain:  Pain Scale 1: Numeric (0 - 10) (17 0045)  Pain Intensity 1: 0 (17)   Managed. Neurological Status:   Neuro (WDL): Within Defined Limits (12/15/17 0710)   At baseline. Mental Status and Level of Consciousness: Arousable. Pulmonary Status:   O2 Device: Room air (17 0019)   Adequate oxygenation and airway patent. Complications related to anesthesia: None    Post-anesthesia assessment completed. No concerns. Patient has met all discharge requirements.     Signed By: Parisa Griffin CRNA    2017

## 2017-12-16 NOTE — PROGRESS NOTES
1922- Bedside shift report received from Sujata Vasquez RN.    2017- Patient turned left lateral with peanut ball. 2115- Patient 9 with lip/100/+1. Meseret Drummond paged. 2155- Patient repositioned high-peace's.    [address] at bedside. SVE by MD 6/100/0.    2208- Bolus infusing following order from Meseret Drummond at this time. 2224- Patient medicated with Tylenol (see MAR) per Meseret Drummond order for fetal tachycardia. 2248- C-section called at this time by Meseret Drummond. 2325- Patient in [address]  2335- Time out  2336- Incision   2338-Uterine   2339- Delivery of viable male infant. Infant taken to warmer for assessment. 2340- Delivery of intact placenta. 2347- 0.2 mg IM Methergine given by Lambert Wagner CRNA following verbal orders from Meseret Drummond. 0003- Incision closed. 0008- Fundus expressed by Marybeth Talavera SA.  0877- Patient out of OR.  0015- Patient back into room. 36- Patient attempting to breastfeed infant. Patient given education regarding breastfeeding. 7455- Patient given nipple shield with instructions. Infant's latch improved with shield. 126- Patient medicated with Toradol (see MAR). 145- Patient states SCD are itching and refusing at this time. Patient states \"I just need a break. \" Family visiting at this time. 230- SCDs reapplied at this time. 457- Patient medicated for pain with Tylenol (see MAR). Pericare performed. New bedpad, pad, and ice pack pad applied. Lights dimmed for patient comfort. 600- Patient sleeping. 715- Bedside shift change report given to MEERA Bhandari RN (oncoming nurse) by Soniya Carrillo. MARGARET Parrish (offgoing nurse). Report included the following information SBAR, Kardex, OR Summary, Intake/Output, MAR and Recent Results.

## 2017-12-16 NOTE — PROGRESS NOTES
Problem: Falls - Risk of  Goal: *Absence of Falls  Document Kolby Fall Risk and appropriate interventions in the flowsheet.    Outcome: Progressing Towards Goal  Fall Risk Interventions:  Mobility Interventions: Utilize walker, cane, or other assitive device         Medication Interventions: Patient to call before getting OOB, Teach patient to arise slowly    Elimination Interventions: Call light in reach, Patient to call for help with toileting needs

## 2017-12-16 NOTE — ROUTINE PROCESS
Bedside shift change report given to Gilford Milder (oncoming nurse) by Mary Huynh (offgoing nurse). Report included the following information SBAR, Procedure Summary, Intake/Output, MAR and Recent Results.

## 2017-12-16 NOTE — PROGRESS NOTES
Minor skin abrasion where tape for epidural catheter was removed. Otherwise no apparent complications post .

## 2017-12-16 NOTE — PROGRESS NOTES
Labor Progress Note  Patient seen, fetal heart rate and contraction pattern evaluated, patient examined. No data found. Physical Exam:  Afebrile  Cervical Exam:  6/100/0 cm dilated    Membranes:  Ruptured at 1630 today  Uterine Activity: Frequency: Every 2 minutes  Fetal Heart Rate: Baseline: 160's per minute    Assessment/Plan:  Continue plan for vaginal delivery at present time. Fetal heart tones are now 160's, afebrile. Will administer fluid bolus. Baseline FHT's were 120-130's. If fetus continues to be tachycardic will need to proceed with  section.

## 2017-12-16 NOTE — PROGRESS NOTES
Labor Progress Note  Patient seen, fetal heart rate and contraction pattern evaluated, patient examined. Patient Vitals for the past 1 hrs:   BP Temp Pulse SpO2   12/15/17 2201 - - - 100 %   12/15/17 2200 131/87 98.6 °F (37 °C) 94 -       Physical Exam:  Temp 99.1  Cervical Exam:  6 cm dilated    Membranes:  ruptured  Uterine Activity: Frequency: Every 2 minutes  Fetal Heart Rate: tachycardia    Assessment/Plan:  Proceed with  Section Nonreassuring fetal status with labor not progressing normally, findings consistent with failure of dilatation and probably chorioamnionitis. Temperature rising despite tylenol and fluid bolus and fetal heart tones tachycardic. Recommended proceeding with  delivery. Risks of bleeding, infection, bladder and bowel damage explained to patient. They understand the situation and consent to the  delivery. OR, anesthesia and nursery have been notified.

## 2017-12-17 LAB
HCT VFR BLD AUTO: 30.2 % (ref 35–45)
HGB BLD-MCNC: 10 G/DL (ref 12–16)

## 2017-12-17 PROCEDURE — 74011250637 HC RX REV CODE- 250/637: Performed by: OBSTETRICS & GYNECOLOGY

## 2017-12-17 PROCEDURE — 36415 COLL VENOUS BLD VENIPUNCTURE: CPT | Performed by: OBSTETRICS & GYNECOLOGY

## 2017-12-17 PROCEDURE — 85018 HEMOGLOBIN: CPT | Performed by: OBSTETRICS & GYNECOLOGY

## 2017-12-17 PROCEDURE — 74011250636 HC RX REV CODE- 250/636: Performed by: OBSTETRICS & GYNECOLOGY

## 2017-12-17 PROCEDURE — 85461 HEMOGLOBIN FETAL: CPT | Performed by: OBSTETRICS & GYNECOLOGY

## 2017-12-17 PROCEDURE — 74011250637 HC RX REV CODE- 250/637: Performed by: ADVANCED PRACTICE MIDWIFE

## 2017-12-17 PROCEDURE — 65270000029 HC RM PRIVATE

## 2017-12-17 PROCEDURE — 86900 BLOOD TYPING SEROLOGIC ABO: CPT | Performed by: OBSTETRICS & GYNECOLOGY

## 2017-12-17 PROCEDURE — 85014 HEMATOCRIT: CPT | Performed by: OBSTETRICS & GYNECOLOGY

## 2017-12-17 RX ADMIN — KETOROLAC TROMETHAMINE 30 MG: 30 INJECTION, SOLUTION INTRAMUSCULAR at 15:46

## 2017-12-17 RX ADMIN — OXYCODONE HYDROCHLORIDE AND ACETAMINOPHEN 2 TABLET: 5; 325 TABLET ORAL at 18:50

## 2017-12-17 RX ADMIN — KETOROLAC TROMETHAMINE 30 MG: 30 INJECTION, SOLUTION INTRAMUSCULAR at 03:57

## 2017-12-17 RX ADMIN — OXYCODONE HYDROCHLORIDE AND ACETAMINOPHEN 2 TABLET: 5; 325 TABLET ORAL at 12:46

## 2017-12-17 RX ADMIN — Medication 10 ML: at 03:57

## 2017-12-17 RX ADMIN — OXYCODONE HYDROCHLORIDE AND ACETAMINOPHEN 2 TABLET: 5; 325 TABLET ORAL at 15:46

## 2017-12-17 RX ADMIN — KETOROLAC TROMETHAMINE 30 MG: 30 INJECTION, SOLUTION INTRAMUSCULAR at 10:15

## 2017-12-17 RX ADMIN — OXYCODONE HYDROCHLORIDE AND ACETAMINOPHEN 2 TABLET: 5; 325 TABLET ORAL at 02:10

## 2017-12-17 RX ADMIN — DOCUSATE SODIUM 100 MG: 100 CAPSULE, LIQUID FILLED ORAL at 08:42

## 2017-12-17 RX ADMIN — OXYCODONE HYDROCHLORIDE AND ACETAMINOPHEN 2 TABLET: 5; 325 TABLET ORAL at 08:42

## 2017-12-17 RX ADMIN — ANTACID TABLETS 200 MG: 500 TABLET, CHEWABLE ORAL at 08:42

## 2017-12-17 NOTE — PROGRESS NOTES
Bedside shift change report given to Chivo Lacy RN (oncoming nurse) by Judd Wong RN (offgoing nurse). Report included the following information SBAR, Kardex, MAR and Recent Results.

## 2017-12-17 NOTE — PROGRESS NOTES
BEDSIDE_VERBAL_RECORDED_WRITTEN: shift change report given to Tushar (oncoming nurse) by Kelly Shelby (offgoing nurse). Report given with SBAR, Kardex and MAR.

## 2017-12-17 NOTE — PROGRESS NOTES
Verbal end of shift report per Humberto Lr RN given to C. [address] [facility] RN . Report included SBAR, MAR, any pertinent lab results and medications.

## 2017-12-17 NOTE — PROGRESS NOTES
Post-Operative  Day 2    Ivette Huber     Assessment:   Hospital Problems  Date Reviewed: 2017          Codes Class Noted POA    S/P  section ICD-10-CM: M87.717  ICD-9-CM: V45.89  2017 No        Arrest of dilation, delivered, current hospitalization ICD-10-CM: O62.1  ICD-9-CM: 661.11  2017 No        Chorioamnionitis ICD-10-CM: I02.7396  ICD-9-CM: 658.40  12/15/2017 No            Post-Op day 2, doing well    Plan:   1. Routine post-operative care    Information for the patient's : Van Noss [[id number]]   , Low Transverse   Patient doing well without significant complaint. Nausea and vomiting resolved, tolerating regular diet, passing flatus, voiding and ambulating without difficulty. Breast feeding baby. To be circumcised today. Current Facility-Administered Medications   Medication Dose Route Frequency    sodium chloride (NS) flush 5-10 mL  5-10 mL IntraVENous Q8H    oxytocin (PITOCIN) 20 units/1000 ml LR  125 mL/hr IntraVENous CONTINUOUS    ketorolac (TORADOL) injection 30 mg  30 mg IntraVENous Q6H    lactated Ringers infusion  125 mL/hr IntraVENous CONTINUOUS    docusate sodium (COLACE) capsule 100 mg  100 mg Oral BID    oxytocin (PITOCIN) 30 units/500 mL D5W  0.5-20 david-units/min IntraVENous TITRATE    lactated Ringers infusion  125 mL/hr IntraVENous CONTINUOUS    sodium chloride (NS) flush 5-10 mL  5-10 mL IntraVENous Q8H    calcium carbonate (TUMS) chewable tablet 200 mg [elemental]  200 mg Oral TID WITH MEALS       Vitals:  Visit Vitals    BP 99/63    Pulse 82    Temp 97.3 °F (36.3 °C)    Resp 18    Ht 5' 7\" (1.702 m)    Wt 225 lb (102.1 kg)    SpO2 98%    Breastfeeding Yes    BMI 35.24 kg/m2     Temp (24hrs), Av.8 °F (36.6 °C), Min:97.3 °F (36.3 °C), Max:98.2 °F (36.8 °C)        Exam:        Patient without distress.                Abdomen, bowel sounds present, soft, expected tenderness, fundus firm Wound incision clean, dry and intact               Lower extremities are negative for swelling, cords or tenderness. Labs:   Lab Results   Component Value Date/Time    WBC 14.4 12/16/2017 01:35 PM    WBC 7.8 12/14/2017 03:40 PM    HGB 10.0 12/17/2017 05:05 AM    HGB 10.4 12/16/2017 01:35 PM    HGB 12.5 12/14/2017 03:40 PM    HCT 30.2 12/17/2017 05:05 AM    HCT 30.7 12/16/2017 01:35 PM    HCT 37.3 12/14/2017 03:40 PM    PLATELET 254 15/20/1332 01:35 PM    PLATELET 623 11/33/0123 03:40 PM       Recent Results (from the past 24 hour(s))   CBC WITH AUTOMATED DIFF    Collection Time: 12/16/17  1:35 PM   Result Value Ref Range    WBC 14.4 (H) 4.6 - 13.2 K/uL    RBC 3.61 (L) 4.20 - 5.30 M/uL    HGB 10.4 (L) 12.0 - 16.0 g/dL    HCT 30.7 (L) 35.0 - 45.0 %    MCV 85.0 74.0 - 97.0 FL    MCH 28.8 24.0 - 34.0 PG    MCHC 33.9 31.0 - 37.0 g/dL    RDW 13.9 11.6 - 14.5 %    PLATELET 066 433 - 621 K/uL    MPV 9.8 9.2 - 11.8 FL    NEUTROPHILS 77 (H) 40 - 73 %    LYMPHOCYTES 14 (L) 21 - 52 %    MONOCYTES 9 3 - 10 %    EOSINOPHILS 0 0 - 5 %    BASOPHILS 0 0 - 2 %    ABS. NEUTROPHILS 11.1 (H) 1.8 - 8.0 K/UL    ABS. LYMPHOCYTES 2.0 0.9 - 3.6 K/UL    ABS. MONOCYTES 1.3 (H) 0.05 - 1.2 K/UL    ABS. EOSINOPHILS 0.1 0.0 - 0.4 K/UL    ABS.  BASOPHILS 0.0 0.0 - 0.06 K/UL    DF AUTOMATED     RH IMMUNE GLOBULIN EVAL-LAB ORDER    Collection Time: 12/17/17  5:05 AM   Result Value Ref Range    ABO/Rh(D) A NEGATIVE     Fetal screen NEG     Cord Blood Type A  POS       CALLED TO: KATY DE LA CRUZ, 2N ON 12/17/2017 0838 BY [facility]     Unit number [id number]     Blood component type RH IMMUNE GLOBULIN     Unit division 00     Status of unit ALLOCATED    HEMOGLOBIN    Collection Time: 12/17/17  5:05 AM   Result Value Ref Range    HGB 10.0 (L) 12.0 - 16.0 g/dL   HEMATOCRIT    Collection Time: 12/17/17  5:05 AM   Result Value Ref Range    HCT 30.2 (L) 35.0 - 45.0 %

## 2017-12-18 VITALS
WEIGHT: 225 LBS | HEIGHT: 67 IN | OXYGEN SATURATION: 97 % | DIASTOLIC BLOOD PRESSURE: 66 MMHG | SYSTOLIC BLOOD PRESSURE: 118 MMHG | TEMPERATURE: 97.7 F | BODY MASS INDEX: 35.31 KG/M2 | RESPIRATION RATE: 16 BRPM | HEART RATE: 79 BPM

## 2017-12-18 PROCEDURE — 74011250637 HC RX REV CODE- 250/637: Performed by: OBSTETRICS & GYNECOLOGY

## 2017-12-18 PROCEDURE — 3E0234Z INTRODUCTION OF SERUM, TOXOID AND VACCINE INTO MUSCLE, PERCUTANEOUS APPROACH: ICD-10-PCS | Performed by: OBSTETRICS & GYNECOLOGY

## 2017-12-18 PROCEDURE — 74011250637 HC RX REV CODE- 250/637: Performed by: ADVANCED PRACTICE MIDWIFE

## 2017-12-18 PROCEDURE — 74011250636 HC RX REV CODE- 250/636: Performed by: OBSTETRICS & GYNECOLOGY

## 2017-12-18 RX ORDER — OXYCODONE AND ACETAMINOPHEN 5; 325 MG/1; MG/1
1-2 TABLET ORAL
Qty: 30 TAB | Refills: 0 | Status: SHIPPED | OUTPATIENT
Start: 2017-12-18

## 2017-12-18 RX ORDER — IBUPROFEN 400 MG/1
800 TABLET ORAL
Status: DISCONTINUED | OUTPATIENT
Start: 2017-12-18 | End: 2017-12-18 | Stop reason: HOSPADM

## 2017-12-18 RX ORDER — IBUPROFEN 800 MG/1
800 TABLET ORAL
Qty: 60 TAB | Refills: 1 | Status: SHIPPED | OUTPATIENT
Start: 2017-12-19

## 2017-12-18 RX ADMIN — ANTACID TABLETS 200 MG: 500 TABLET, CHEWABLE ORAL at 08:49

## 2017-12-18 RX ADMIN — OXYCODONE HYDROCHLORIDE AND ACETAMINOPHEN 2 TABLET: 5; 325 TABLET ORAL at 01:04

## 2017-12-18 RX ADMIN — HUMAN RHO(D) IMMUNE GLOBULIN 0.3 MG: 300 INJECTION, SOLUTION INTRAMUSCULAR at 07:59

## 2017-12-18 RX ADMIN — IBUPROFEN 800 MG: 400 TABLET ORAL at 08:50

## 2017-12-18 RX ADMIN — DOCUSATE SODIUM 100 MG: 100 CAPSULE, LIQUID FILLED ORAL at 08:50

## 2017-12-18 NOTE — PROGRESS NOTES
Bedside shift change report given to Rickey Nguyen RN (oncoming nurse) by Dang Renee. Elsi Baxter RN (offgoing nurse).  Report included the following information SBAR, Procedure Summary, Intake/Output, MAR and Recent Results.

## 2017-12-18 NOTE — DISCHARGE SUMMARY
Obstetrical Discharge Summary     Name: Miesha Alejandro MRN: [id number]  SSN: [id number]    YOB: 1990  Age: 28 y.o. Sex: female      Admit Date: 2017    Discharge Date: 2017     Admitting Physician: Elodia Arboleda MD     Attending Physician:  Elodia Arboleda MD     Admission Diagnoses: labor  Oligohydramnios    Discharge Diagnoses:   Information for the patient's : Damaris Rao [[id number]]   Delivery of a 3.616 kg male infant via , Low Transverse on 12/15/2017 at 11:39 PM  by . Apgars were 7 and 9. Additional Diagnoses:   Hospital Problems  Date Reviewed: 2017          Codes Class Noted POA    S/P  section ICD-10-CM: Z98.891  ICD-9-CM: V45.89  2017 No        Arrest of dilation, delivered, current hospitalization ICD-10-CM: O62.1  ICD-9-CM: 661.11  2017 No        Chorioamnionitis ICD-10-CM: O41.1290  ICD-9-CM: 658.40  12/15/2017 No             Lab Results   Component Value Date/Time    Rubella, External immune 2017    GrBStrep, External negative 2017       Immunization(s):   Immunization History   Administered Date(s) Administered    Rho(D) Immune Globulin - IM 09/15/2017        Labs: No results found for this or any previous visit (from the past 24 hour(s)). Exam:  Chest is clear to auscultation. Fundus firm and nontender  Bowel sounds are normal  Legs are normal without tenderness, swelling, or redness    Hospital Course: Normal hospital course following the delivery. Patient Instructions:   Current Discharge Medication List      CONTINUE these medications which have NOT CHANGED    Details   calcium carbonate (TUMS) 200 mg calcium (500 mg) chew Take 1 Tab by mouth daily. [id number] jeremiah-folic-dha (PRENATAL DHA+COMPLETE PRENATAL) -300 mg-mcg-mg cmpk Take  by mouth. acetaminophen (TYLENOL) 325 mg tablet Take 650 mg by mouth every four (4) hours as needed for Pain.              Reference my discharge instructions. No orders of the defined types were placed in this encounter.        Signed By:  Sumit Gold MD     December 18, 2017

## 2017-12-18 NOTE — PROGRESS NOTES
Bedside and Verbal shift change report given to JEREMIAH Paige RN  (oncoming nurse) by Rajeev Alvarez RN   (offgoing nurse). Report included the following information SBAR, MAR and Recent Results.

## 2017-12-18 NOTE — ROUTINE PROCESS
I have reviewed discharge instructions with the parent. The parent verbalized understanding and is stable at this time.

## 2017-12-19 LAB
ABO + RH BLD: NORMAL
ABO + RH BLDCO: NORMAL
BLD PROD TYP BPU: NORMAL
BPU ID: NORMAL
CALLED TO:,BCALL1: NORMAL
FETAL SCREEN,FMHS: NORMAL
STATUS OF UNIT,%ST: NORMAL
UNIT DIVISION, %UDIV: 0

## (undated) DEVICE — SOL IRRIGATION INJ NACL 0.9% 500ML BTL

## (undated) DEVICE — SUTURE VCRL SZ 1 L36IN ABSRB UD L36MM CTB-1 1/2 CIR BLNT JB947

## (undated) DEVICE — HEX-LOCKING BLADE ELECTRODE: Brand: EDGE

## (undated) DEVICE — DEVON™ KNEE AND BODY STRAP 60" X 3" (1.5 M X 7.6 CM): Brand: DEVON

## (undated) DEVICE — REM POLYHESIVE ADULT PATIENT RETURN ELECTRODE: Brand: VALLEYLAB

## (undated) DEVICE — DERMABOND SKIN ADH 0.7ML -- DERMABOND ADVANCED 12/BX

## (undated) DEVICE — SUT VCRL + 2-0 36IN CT1 UD --

## (undated) DEVICE — KENDALL SCD EXPRESS SLEEVES, KNEE LENGTH, MEDIUM: Brand: KENDALL SCD

## (undated) DEVICE — INTENDED FOR TISSUE SEPARATION, AND OTHER PROCEDURES THAT REQUIRE A SHARP SURGICAL BLADE TO PUNCTURE OR CUT.: Brand: BARD-PARKER ® CARBON RIB-BACK BLADES

## (undated) DEVICE — PACK PROCEDURE SURG BIRTH

## (undated) DEVICE — LARGE, DISPOSABLE ALEXIS O C-SECTION PROTECTOR - RETRACTOR: Brand: ALEXIS ® O C-SECTION PROTECTOR - RETRACTOR

## (undated) DEVICE — 3L THIN WALL CAN: Brand: CRD

## (undated) DEVICE — STAPLER SKIN SQ 30 ABSRB STPL DISP INSORB